# Patient Record
Sex: FEMALE | Race: BLACK OR AFRICAN AMERICAN
[De-identification: names, ages, dates, MRNs, and addresses within clinical notes are randomized per-mention and may not be internally consistent; named-entity substitution may affect disease eponyms.]

---

## 2019-12-27 PROBLEM — Z00.00 ENCOUNTER FOR PREVENTIVE HEALTH EXAMINATION: Status: ACTIVE | Noted: 2019-12-27

## 2019-12-31 ENCOUNTER — APPOINTMENT (OUTPATIENT)
Dept: PHYSICAL MEDICINE AND REHAB | Facility: CLINIC | Age: 59
End: 2019-12-31
Payer: COMMERCIAL

## 2019-12-31 VITALS
SYSTOLIC BLOOD PRESSURE: 104 MMHG | BODY MASS INDEX: 25.27 KG/M2 | HEART RATE: 73 BPM | DIASTOLIC BLOOD PRESSURE: 66 MMHG | HEIGHT: 64 IN | WEIGHT: 148 LBS

## 2019-12-31 DIAGNOSIS — Z86.79 PERSONAL HISTORY OF OTHER DISEASES OF THE CIRCULATORY SYSTEM: ICD-10-CM

## 2019-12-31 DIAGNOSIS — M25.511 PAIN IN RIGHT SHOULDER: ICD-10-CM

## 2019-12-31 DIAGNOSIS — M25.512 PAIN IN RIGHT SHOULDER: ICD-10-CM

## 2019-12-31 DIAGNOSIS — M18.0 BILATERAL PRIMARY OSTEOARTHRITIS OF FIRST CARPOMETACARPAL JOINTS: ICD-10-CM

## 2019-12-31 DIAGNOSIS — M79.10 MYALGIA, UNSPECIFIED SITE: ICD-10-CM

## 2019-12-31 DIAGNOSIS — G89.29 PAIN IN RIGHT SHOULDER: ICD-10-CM

## 2019-12-31 PROCEDURE — 99244 OFF/OP CNSLTJ NEW/EST MOD 40: CPT

## 2019-12-31 RX ORDER — AMLODIPINE BESYLATE 5 MG/1
5 TABLET ORAL
Refills: 0 | Status: ACTIVE | COMMUNITY

## 2019-12-31 NOTE — PHYSICAL EXAM
[FreeTextEntry1] : PHYSICAL EXAM : OBJECTIVE \par \par GENERAL : Awake ,alert and oriented to time place and person \par HEAD : normocephalic and atraumatic \par NECK : supple ,no tracheal deviation ,no thyroid enlargement noted with swallowing\par EYES : sclera and conjunctiva normal no redness,intact extraocular movements \par ENT  : ears and nose normal in appearance -hearing adequate \par PULMONARY: effort normal. No respiratory distress. breathing regular. No wheezes \par LYMPH : No swelling in limbs, capillary return within normal range \par CVS : warm extremities,no palpitations,not short of breath, no visible jugular venous distention\par PSYCH : mood and affect normal ,good eye contact ,normal attention \par ABDOMEN : no visible distension , \par NEUROLOGICAL:cranial nerves intact,muscle tone normal,gait and balance safe except where noted below \par SKIN : warm and dry No rash detected over specific body areas examined \par MUSCULOSKELETAL: normal muscle bulk, no focal bony tenderness /posture normal except where specified below\par  thumb tender over CMC ++ R > L \par thenar atrophy \par \par shoulders full ROM but pain and weakness -has night pain ++\par \par no neurology \par MMT 5/5\par no impingement \par tender biceps and deltoid s to palpation \par No long tract signs found on clinical exam and no focal neurological deficits\par \par temporal arteries intact alfonzo \par

## 2019-12-31 NOTE — HISTORY OF PRESENT ILLNESS
[FreeTextEntry1] : Ms. GI ARROYO is a very pleasant 59 year female who comes in for evaluation of 2 issues alfonzo shoulder pains as well as thumb pains   that has been ongoing for 4 months  without any specific injury or inciting event. The pain is located primarily shoulder blades  intermittent in nature and described as sharp and shooting  . The pain is rated as 7/10 during today's visit, and ranges from 5-7/10. The patient's symptoms are aggravated by ? shoulders but hands worst with holding gripping unscrewing jars   and alleviated by not much . The patient denies any night pain, numbness/tingling, weakness, or bowel/bladder dysfunction. The patient has no other complaints at this time.\par her work involves computers typing

## 2019-12-31 NOTE — ASSESSMENT
[FreeTextEntry1] : \par PLAN AND RECOMMENDATIONS :\par \par We discussed differential diagnosis and clinical impression\par she has 2 problems as i see it -thumb OA significant and proximal muscle pain- rule out polymyalgia rheumatica \par \par Recommend\par .symptomatic care and support -given a thumb spica gratis to protect and stabiliize the joint \par  medications OTC \par  imaging hold off hand xrays for now -clinical  \par  referral to PT include paraffin bath hands -consider home unit \par also discussed cortisone injections into CMC joint -done by hand ortho -would refer \par  hydrotherapy /heat / cold for pain\par  continue ergonomic precautions \par  relative rest and avoidance of painful activity where possible \par  increasing activity as discussed \par  she has trouble unscrewing jars- may need some ADL devices used for arthritic hands \par labs CK and sed rate rule out PM

## 2019-12-31 NOTE — REVIEW OF SYSTEMS
[Joint Stiffness] : joint stiffness [Muscle Pain] : muscle pain [Muscle Weakness] : muscle weakness [Negative] : Heme/Lymph [Fever] : no fever [Chills] : no chills [Fatigue] : no fatigue [Joint Pain] : no joint pain [Skin Rash] : no skin rash [Recent Change In Weight] : ~T no recent weight change [Difficulty Walking] : no difficulty walking [Skin Wound] : no skin wound [de-identified] : no atrophy

## 2019-12-31 NOTE — CONSULT LETTER
[FreeTextEntry1] : Dear Dr. Sampson \par \par I had the pleasure of evaluating your patient, GI ARROYO .\par \par Thank you very much for allowing me to participate in the care of this patient. If you have any questions, please do not hesitate to contact me. \par \par \par \par Sincerely, \par Dian Frias MD \par \par ABPMR Board Certified in Physical Medicine and Rehabilitation\par Certified Fellow of AANEM (Neuromuscular and Electrodiagnostic Medicine)\par Subspecialty certified in Sports Medicine (ABPMR)\par \par  of Physical Medicine and Rehabilitation\par [employer] [facility] [employer] [facility]\par [facility]\par \par